# Patient Record
Sex: FEMALE | URBAN - METROPOLITAN AREA
[De-identification: names, ages, dates, MRNs, and addresses within clinical notes are randomized per-mention and may not be internally consistent; named-entity substitution may affect disease eponyms.]

---

## 2020-10-15 ENCOUNTER — POST-OPERATIVE VISIT (OUTPATIENT)
Dept: URBAN - METROPOLITAN AREA CLINIC 41 | Facility: CLINIC | Age: 85
End: 2020-10-15
Payer: MEDICARE

## 2020-10-15 PROCEDURE — 99024 POSTOP FOLLOW-UP VISIT: CPT | Performed by: OPHTHALMOLOGY

## 2020-10-15 ASSESSMENT — INTRAOCULAR PRESSURE
OS: 14
OD: 9

## 2020-10-15 NOTE — IMPRESSION/PLAN
Impression: S/P 25g PPV/poss EL/ poss air vs gas OS - 15 Days. Vitreous hemorrhage, left eye  H43.12. Plan: Retina attached. VH resolved (secondary to MARY). No s/s of infection. IOP stable. RD/Endoph WS discussed. Taper PF - D/c Ofloxacin RTC 4-6 weeks PO/OCT OS

## 2020-11-12 ENCOUNTER — POST-OPERATIVE VISIT (OUTPATIENT)
Dept: URBAN - METROPOLITAN AREA CLINIC 41 | Facility: CLINIC | Age: 85
End: 2020-11-12
Payer: MEDICARE

## 2020-11-12 PROCEDURE — 99024 POSTOP FOLLOW-UP VISIT: CPT | Performed by: OPHTHALMOLOGY

## 2020-11-12 PROCEDURE — 92134 CPTRZ OPH DX IMG PST SGM RTA: CPT | Performed by: OPHTHALMOLOGY

## 2020-11-12 ASSESSMENT — INTRAOCULAR PRESSURE
OD: 9
OS: 10

## 2020-11-12 NOTE — IMPRESSION/PLAN
Impression: S/P 25g PPV/poss EL/ poss air vs gas OS - 43 Days. Vitreous hemorrhage, left eye  H43. 12. Retinal arteriolar macroaneurysm Plan: --retina attached
--no s/s infection
--IOP acceptable
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

## 2021-04-15 ENCOUNTER — OFFICE VISIT (OUTPATIENT)
Dept: URBAN - METROPOLITAN AREA CLINIC 41 | Facility: CLINIC | Age: 86
End: 2021-04-15
Payer: MEDICARE

## 2021-04-15 DIAGNOSIS — H43.12 VITREOUS HEMORRHAGE, LEFT EYE: Primary | ICD-10-CM

## 2021-04-15 DIAGNOSIS — Z96.1 PRESENCE OF INTRAOCULAR LENS: ICD-10-CM

## 2021-04-15 DIAGNOSIS — E11.9 TYPE 2 DIABETES MELLITUS WITHOUT COMPLICATIONS: ICD-10-CM

## 2021-04-15 PROCEDURE — 99213 OFFICE O/P EST LOW 20 MIN: CPT | Performed by: OPHTHALMOLOGY

## 2021-04-15 PROCEDURE — 92134 CPTRZ OPH DX IMG PST SGM RTA: CPT | Performed by: OPHTHALMOLOGY

## 2021-04-15 ASSESSMENT — INTRAOCULAR PRESSURE
OS: 11
OD: 9

## 2021-04-15 NOTE — IMPRESSION/PLAN
Impression: Presence of intraocular lens: Z96.1. Bilateral. Status: Asymptomatic. Plan: Lens in good place; observe.

## 2021-04-15 NOTE — IMPRESSION/PLAN
Impression:  Vitreous hemorrhage, left eye  H43. 12. Retinal arteriolar macroaneurysm S/P 25g PPV/poss EL/ poss air vs gas 9/30/2020 Plan: --retina attached
--VH remains fully resolved
--OCT shows resolved CME (juxtafoveal) --she will return to her optometrist for MRx RTC PRN

## 2021-04-15 NOTE — IMPRESSION/PLAN
Impression: Type 2 diabetes mellitus without complications: W78.7. Bilateral. Status: Asymptomatic. Plan: Exam demonstrates no DR. Tight BS/BP control discussed.